# Patient Record
Sex: MALE | Race: WHITE
[De-identification: names, ages, dates, MRNs, and addresses within clinical notes are randomized per-mention and may not be internally consistent; named-entity substitution may affect disease eponyms.]

---

## 2019-04-24 ENCOUNTER — HOSPITAL ENCOUNTER (INPATIENT)
Dept: HOSPITAL 74 - JER | Age: 64
LOS: 2 days | Discharge: HOME | DRG: 199 | End: 2019-04-26
Attending: INTERNAL MEDICINE | Admitting: INTERNAL MEDICINE
Payer: COMMERCIAL

## 2019-04-24 VITALS — BODY MASS INDEX: 25.8 KG/M2

## 2019-04-24 DIAGNOSIS — E03.9: ICD-10-CM

## 2019-04-24 DIAGNOSIS — E88.09: ICD-10-CM

## 2019-04-24 DIAGNOSIS — F14.23: ICD-10-CM

## 2019-04-24 DIAGNOSIS — N17.9: ICD-10-CM

## 2019-04-24 DIAGNOSIS — R94.31: ICD-10-CM

## 2019-04-24 DIAGNOSIS — I42.8: ICD-10-CM

## 2019-04-24 DIAGNOSIS — I50.22: ICD-10-CM

## 2019-04-24 DIAGNOSIS — R80.9: ICD-10-CM

## 2019-04-24 DIAGNOSIS — Z95.1: ICD-10-CM

## 2019-04-24 DIAGNOSIS — F11.23: ICD-10-CM

## 2019-04-24 DIAGNOSIS — E78.5: ICD-10-CM

## 2019-04-24 DIAGNOSIS — I16.1: Primary | ICD-10-CM

## 2019-04-24 DIAGNOSIS — N18.4: ICD-10-CM

## 2019-04-24 DIAGNOSIS — F41.8: ICD-10-CM

## 2019-04-24 DIAGNOSIS — F17.210: ICD-10-CM

## 2019-04-24 DIAGNOSIS — I25.2: ICD-10-CM

## 2019-04-24 DIAGNOSIS — I13.0: ICD-10-CM

## 2019-04-24 DIAGNOSIS — K59.00: ICD-10-CM

## 2019-04-24 LAB
ALBUMIN SERPL-MCNC: 2.8 G/DL (ref 3.4–5)
ALP SERPL-CCNC: 55 U/L (ref 45–117)
ALT SERPL-CCNC: 24 U/L (ref 13–61)
ANION GAP SERPL CALC-SCNC: 6 MMOL/L (ref 8–16)
AST SERPL-CCNC: 33 U/L (ref 15–37)
BASOPHILS # BLD: 1 % (ref 0–2)
BILIRUB SERPL-MCNC: 0.3 MG/DL (ref 0.2–1)
BNP SERPL-MCNC: 955.2 PG/ML (ref 5–125)
BUN SERPL-MCNC: 26 MG/DL (ref 7–18)
CALCIUM SERPL-MCNC: 8.1 MG/DL (ref 8.5–10.1)
CHLORIDE SERPL-SCNC: 106 MMOL/L (ref 98–107)
CO2 SERPL-SCNC: 29 MMOL/L (ref 21–32)
CREAT SERPL-MCNC: 2.8 MG/DL (ref 0.55–1.3)
DEPRECATED PREALB SERPL-CCNC: 27.8 MG/DL (ref 20–40)
DEPRECATED RDW RBC AUTO: 15.6 % (ref 11.9–15.9)
EOSINOPHIL # BLD: 9.5 % (ref 0–4.5)
GLUCOSE SERPL-MCNC: 79 MG/DL (ref 74–106)
HCT VFR BLD CALC: 36.3 % (ref 35.4–49)
HGB BLD-MCNC: 12 GM/DL (ref 11.7–16.9)
INR BLD: 0.91 (ref 0.83–1.09)
LYMPHOCYTES # BLD: 24.8 % (ref 8–40)
MAGNESIUM SERPL-MCNC: 1.9 MG/DL (ref 1.8–2.4)
MCH RBC QN AUTO: 30.7 PG (ref 25.7–33.7)
MCHC RBC AUTO-ENTMCNC: 33.1 G/DL (ref 32–35.9)
MCV RBC: 92.7 FL (ref 80–96)
MONOCYTES # BLD AUTO: 9.3 % (ref 3.8–10.2)
NEUTROPHILS # BLD: 55.4 % (ref 42.8–82.8)
PHOSPHATE SERPL-MCNC: 3 MG/DL (ref 2.5–4.9)
PLATELET # BLD AUTO: 266 K/MM3 (ref 134–434)
PMV BLD: 8 FL (ref 7.5–11.1)
POTASSIUM SERPLBLD-SCNC: 4.4 MMOL/L (ref 3.5–5.1)
PROT SERPL-MCNC: 6.4 G/DL (ref 6.4–8.2)
PT PNL PPP: 10.7 SEC (ref 9.7–13)
RBC # BLD AUTO: 3.91 M/MM3 (ref 4–5.6)
SODIUM SERPL-SCNC: 141 MMOL/L (ref 136–145)
WBC # BLD AUTO: 5.1 K/MM3 (ref 4–10)

## 2019-04-24 PROCEDURE — G0378 HOSPITAL OBSERVATION PER HR: HCPCS

## 2019-04-24 RX ADMIN — HEPARIN SODIUM SCH UNIT: 5000 INJECTION, SOLUTION INTRAVENOUS; SUBCUTANEOUS at 23:10

## 2019-04-24 RX ADMIN — POLYETHYLENE GLYCOL 3350 SCH: 17 POWDER, FOR SOLUTION ORAL at 12:02

## 2019-04-24 NOTE — HP
CHIEF COMPLAINT: Sent from [facility] fro Elevated BP





PCP: Dr Arango





HISTORY OF PRESENT ILLNESS:


Pt is a 62 yo M with a PMH of fentanyl, heroin and cocaine dependence, s/p 

thyroidectomy with hypothyroidism, HTN, MI, CAD s/p CABG (2016), and CKD (

reports one kidney), depression, anxiety sent in from [facility] for elevated 

blood pressure to 217/121 after he presented for heroin detox. Pt reports no 

chest pain or SOB. Has bilateral leg swelling but can not give duration. 

Ambulates with a cane for gait instability. Pt reports a chronic cough, non 

productive (current smoker), no hx of COPD or asthma.  Pt is non-domiciled, has 

been snorting heroin/fentanyl combined for over 20 years, last use reported was 

yesterday. Pt denies injection drug use. Denies alcohol use but reports 

unprotected sexual intercourse. Said he was negative for HIV in distant past, 

no recent tests done. Patient had a prior short term detox (1 year ago) in the 

[place], does not want long term rehab. This morning he was asking for his "dope" 

while I examined him.


No fevers, no dysuria or abnormal discharge, pt reports constipation requiring 

suppositories





ER course was notable for:


(1) Elevated BP- 224/110>>179/111>>129/139


(2) HCTZ in ED


(3) BUN/Cr- 26/2.8, trop-0.04,


((4) EKG- SR-61bpm, LAD, LVH (V3 criteria), TWI (inferiorlat leads) no prior to 

compare with, QTC-457


(5) CXR- No CHF, no acute changes, Cardiomegaly with aortic ununcoiling





Recent Travel:





PAST MEDICAL HISTORY:


fentanyl, heroin and cocaine dependence, s/p thyroidectomy with hypothyroidism, 

HTN, MI, CAD s/p CABG (2016), and CKD, depression, anxiety





PAST SURGICAL HISTORY:


s/p thyroidectomy


s/p CABG





Social History:


Smoking:cigarettes 1ppd x30 until 1/2 years ago now 1-2 cigs per day


Alcohol:Denies


Drugs:  fentanyl, heroin and cocaine 





Family History:


Allergies





No Known Allergies Allergy (Verified 04/23/19 22:59)


 








HOME MEDICATIONS:


 Home Medications











 Medication  Instructions  Recorded


 


Levothyroxine [Synthroid -] 125 mcg PO DAILY 04/23/19








REVIEW OF SYSTEMS


CONSTITUTIONAL: 


Absent:  fever, chills, diaphoresis, generalized weakness, malaise, loss of 

appetite, weight change


HEENT: 


Absent:  rhinorrhea, nasal congestion, throat pain, throat swelling, difficulty 

swallowing, mouth swelling, ear pain, eye pain, visual changes


CARDIOVASCULAR: 


Absent: chest pain, syncope, palpitations, irregular heart rate, lightheadedness

, peripheral edema


RESPIRATORY: 


Absent: cough, shortness of breath, dyspnea with exertion, orthopnea, wheezing, 

stridor, hemoptysis


GASTROINTESTINAL:


Absent: abdominal pain, abdominal distension, nausea, vomiting, diarrhea, 

constipation, melena, hematochezia


GENITOURINARY: 


Absent: dysuria, frequency, urgency, hesitancy, hematuria, flank pain, genital 

pain


MUSCULOSKELETAL: 


Absent: myalgia, arthralgia, joint swelling, back pain, neck pain


SKIN: 


Absent: rash, itching, pallor


HEMATOLOGIC/IMMUNOLOGIC: 


Absent: easy bleeding, easy bruising, lymphadenopathy, frequent infections


ENDOCRINE:


Absent: unexplained weight gain, unexplained weight loss, heat intolerance, 

cold intolerance


NEUROLOGIC: 


Absent: headache, focal weakness or paresthesias, dizziness, unsteady gait, 

seizure, mental status changes, bladder or bowel incontinence


PSYCHIATRIC: 


Absent: anxiety, depression, suicidal or homicidal ideation, hallucinations.








PHYSICAL EXAMINATION


 Vital Signs - 24 hr











  04/24/19 04/24/19 04/24/19





  01:15 05:53 06:59


 


Temperature 98.4 F 97.6 F 


 


Pulse Rate 68  


 


Pulse Rate [  63 





Left Radial]   


 


Respiratory 18  





Rate   


 


Blood Pressure 224/110 H  221/139 H


 


Blood Pressure  179/111 H 





[Right Arm]   


 


O2 Sat by Pulse 98 99 





Oximetry (%)   











GENERAL: Awake, alert, and fully oriented, in no acute distress, yawning 

continously, reporting feeling cold.


HEAD: Normal with no signs of trauma.


EYES: Miosed,Pupils equal, round and reactive to light, extraocular movements 

intact, sclera anicteric, conjunctiva clear. 


EARS, NOSE, THROAT: oropharynx clear without exudates. Moist mucous membranes.


NECK: Normal range of motion, supple without lymphadenopathy, JVD


LUNGS: Healed Stenotomy scar, Breath sounds equal, clear to auscultation 

bilaterally. No wheezes, and no crackles. 


HEART: Regular rate and rhythm, normal S1 and S2 without murmur


ABDOMEN: Soft, nontender, not distended, normoactive bowel sounds, no guarding, 

no rebound, no masses. 


MUSCULOSKELETAL: Normal range of motion at all joints. No bony deformities or 

tenderness. No CVA tenderness.


LOWER EXTREMITIES: 2+ pulses, warm, well-perfused. No calf tenderness. No 

peripheral edema. 


NEUROLOGICAL:  Cranial nerves II-XII intact. Normal speech. Normal gait.


PSYCHIATRIC: Cooperative. Good eye contact. Appropriate mood and affect.


SKIN: Warm, dry, normal turgor, no rashes or lesions noted, normal capillary 

refill. No needle tracks





 CBC, BMP





 04/24/19 05:40 





 04/24/19 05:40 








 Laboratory Results - last 24 hr











  04/24/19 04/24/19 04/24/19





  05:40 05:40 05:40


 


WBC  5.1  


 


RBC  3.91 L  


 


Hgb  12.0  


 


Hct  36.3  


 


MCV  92.7  


 


MCH  30.7  


 


MCHC  33.1  


 


RDW  15.6  


 


Plt Count  266  


 


MPV  8.0  


 


Absolute Neuts (auto)  2.8  


 


Neutrophils %  55.4  


 


Lymphocytes %  24.8  


 


Monocytes %  9.3  


 


Eosinophils %  9.5 H  


 


Basophils %  1.0  


 


Nucleated RBC %  0  


 


PT with INR   10.70 


 


INR   0.91 


 


Sodium    141


 


Potassium    4.4


 


Chloride    106


 


Carbon Dioxide    29


 


Anion Gap    6 L


 


BUN    26 H


 


Creatinine    2.8 H


 


Creat Clearance w eGFR    23.08


 


Random Glucose    79


 


Calcium    8.1 L


 


Phosphorus    3.0


 


Magnesium    1.9


 


Total Bilirubin    0.3


 


AST    33


 


ALT    24


 


Alkaline Phosphatase    55


 


Creatine Kinase    697 H


 


Creatine Kinase Index    1.4


 


CK-MB (CK-2)    9.9 H


 


Troponin I    0.04


 


B-Natriuretic Peptide    955.2 H


 


Total Protein    6.4


 


Albumin    2.8 L











ASSESSMENT/PLAN:





Pt is a 62 yo M with a PMH of fentanyl, heroin and cocaine dependence, s/p 

thyroidectomy with hypothyroidism, HTN, MI, CAD s/p CABG (2016), and CKD (

reports one kidney),  depression, anxiety sent in from [facility] for elevated 

blood pressure to 217/121 after he presented for heroin detox. 





#Hypertensive Emergency


BP-taken by me-220/136 with elevated creatinine- 2.8


Unsure of patient's baseline, will be cautious in dropping the BP too rapidly


Unclear if CKD or CUAUHTEMOC on CKD (one kidney reported)


Pt received HCTZ - will dc HCTZ with poor renal function


Give hydralazine 10mg tid


Q1H BP


Urinary lytes


Spot cr-alb ratio


UA stat


Kidney/Bladder -US


BNP-955, pt with b/l leg edema but does not look hypervolemic


Tele


ECHO


Pt with cardiomegaly on CXR


 EKG- SR-61bpm, LAD, LVH (V3 criteria), TWI (inferiorlat leads) no prior to 

compare with, QTC-457


Trops-0.04, trend


Repeat EKG in am (on methadone)


CXR- No CHF, no PNA, no atelectasis, uncoiling of aorta, cardiomegaly





#Elevated Cr


Could be in setting of hypertensive emergency


BP control as above


Pt reports one kidney, unclear if he had nephrectomy, could be related to 

chronic, poorly mx hypertension


Will hold off hydration at this time


Urinary lytes


Spot cr-alb ratio


UA stat


Kidney/Bladder -US





#Polysubstance use disorder


UA, utox stat


COWS- 5


Patient requesting his dope


Will start methadone taper


Avoid BB blockers





#s/p thyroidectomy with hypothyroidism


Pt said to be on levothyroxine





#Hx of MI, CAD s/p CABG (2016)


Unsure of medications/compliance of pt


EKG- SR-61bpm, LAD, LVH (V3 criteria), TWI (inferiorlat leads) no prior to 

compare with, QTC-457


No prior EKG to compare with


Unlikely new changes as patient had no chest pain and minimally elevated trops 

which could be in setting of demand ischemia due to the elevated BP, or due to 

poor excretion from the renal pathology


Will trend trops


Will hold off AC at this time


Will confirm medications


Pt needs to avoid BB blockers in setting of polysubstance abuse





#FEN


No standing fluids


Monitor  serum lytes, replete as needed


Urinary lytes


Sodium controlled diet





#PPx


Heparin sq





#Dispo


Admit Tele


























Visit type





- Emergency Visit


Emergency Visit: Yes


ED Registration Date: 04/24/19


Care time: The patient presented to the Emergency Department on the above date 

and was hospitalized for further evaluation of their emergent condition.





- New Patient


This patient is new to me today: Yes


Date on this admission: 04/24/19





- Critical Care


Critical Care patient: No

## 2019-04-24 NOTE — PDOC
Documentation entered by Lolis Hicks SCRIBE, acting as scribe for Dorys Mortensen DO.








Dorys Mortensen DO:  This documentation has been prepared by the 

Fabiola fong Daisy, SCRIBE, under my direction and personally reviewed by me 

in its entirety.  I confirm that the documentation accurately reflects all work

, treatment, procedures, and medical decision making performed by me.  





Attending Attestation





- Resident


Resident Name: Ayesha Hamilton





- ED Attending Attestation


I have performed the following: I have examined & evaluated the patient, The 

case was reviewed & discussed with the resident, I agree w/resident's findings 

& plan





- HPI


HPI: 





04/24/19 05:27





The patient is a 63 YOM with a PMH of fentanyl, heroin and cocaine dependence, 

thyroid removal now with hypothyroidism, HTN, MI, CAD s/p CABG, and CKD sent in 

from [facility] for elevated blood pressure. Patient''s bp was found to be in 

the 200s over 140s. Patient was refusing his methadone over at [facility] 2/2 

his kidney issues. 





The patient denies chest pain, shortness of breath, headache and dizziness.


Denies fever, chills, nausea, vomit, diarrhea and constipation.


Denies dysuria, frequency, urgency and hematuria.





Allergies: NKDA


Surgeries: s/p CABG 





 








- Physicial Exam


PE: 





04/24/19 05:26


Agree with resident's exam.





- Medical Decision Making





04/24/19 06:13


62 yo male sent for med clearance from rehab due to elevated blood pressure.


pt has no complaints at this time


Plan for EKG, labs and return to rehabilitation facility if cleared

## 2019-04-24 NOTE — EKG
Test Reason : 

Blood Pressure : ***/*** mmHG

Vent. Rate : 061 BPM     Atrial Rate : 061 BPM

   P-R Int : 134 ms          QRS Dur : 094 ms

    QT Int : 454 ms       P-R-T Axes : 022 -61 267 degrees

   QTc Int : 457 ms

 

NORMAL SINUS RHYTHM WITH SINUS ARRHYTHMIA

LEFT AXIS DEVIATION

MINIMAL VOLTAGE CRITERIA FOR LVH, MAY BE NORMAL VARIANT

SEPTAL INFARCT , AGE UNDETERMINED

T WAVE ABNORMALITY, CONSIDER INFEROLATERAL ISCHEMIA

ABNORMAL ECG

NO PREVIOUS ECGS AVAILABLE

Confirmed by ZIA TOMLIN, JAXON ([id number]) on 4/24/2019 10:27:31 AM

 

Referred By:             Confirmed By:JAXON LIZARRAGA MD

## 2019-04-24 NOTE — PN
Teaching Attending Note


Name of Resident: Jennifer Pereira





ATTENDING PHYSICIAN STATEMENT





I saw and evaluated the patient.


I reviewed the resident's note and discussed the case with the resident.


I agree with the resident's findings and plan as documented.








SUBJECTIVE: This is a 63 year old man with a history of HTN, CAD, MI, CABG, 

hypothyroidism, thyroidectomy, CKD, depression, anxiety who presented to [facility] last night requesting detox from heroin. He was not withdrawing at the 

time. He did not want to be detoxed with methadone. BP was 217/121 and so he 

was sent to the ED. Currently he is lethargic, yawning, and is asking for 

something for withdrawal.








OBJECTIVE:


 Vital Signs











 Period  Temp  Pulse  Resp  BP Sys/Jimenez  Pulse Ox


 


 Last 24 Hr  97.6 F-98.4 F  61-68  16-18  179-224/  96-99








HEART: S1S2, RRR


LUNGS: Clear


ABDOMEN: Soft, non-tender, non-distended, normal BS


EXTREMITIES: No edema


 Laboratory Tests











  04/24/19 04/24/19 04/24/19





  05:40 05:40 05:40


 


WBC  5.1  


 


RBC  3.91 L  


 


Hgb  12.0  


 


Hct  36.3  


 


MCV  92.7  


 


MCH  30.7  


 


MCHC  33.1  


 


RDW  15.6  


 


Plt Count  266  


 


MPV  8.0  


 


Absolute Neuts (auto)  2.8  


 


Neutrophils %  55.4  


 


Lymphocytes %  24.8  


 


Monocytes %  9.3  


 


Eosinophils %  9.5 H  


 


Basophils %  1.0  


 


Nucleated RBC %  0  


 


PT with INR   10.70 


 


INR   0.91 


 


Sodium    141


 


Potassium    4.4


 


Chloride    106


 


Carbon Dioxide    29


 


Anion Gap    6 L


 


BUN    26 H


 


Creatinine    2.8 H


 


Creat Clearance w eGFR    23.08


 


Random Glucose    79


 


Calcium    8.1 L


 


Phosphorus    3.0


 


Magnesium    1.9


 


Total Bilirubin    0.3


 


AST    33


 


ALT    24


 


Alkaline Phosphatase    55


 


Creatine Kinase    697 H


 


Creatine Kinase Index    1.4


 


CK-MB (CK-2)    9.9 H


 


Troponin I    0.04


 


B-Natriuretic Peptide    955.2 H


 


Total Protein    6.4


 


Albumin    2.8 L


 


Prealbumin    27.8


 


TSH    79.70 H








 Home Medications











 Medication  Instructions  Recorded


 


Levothyroxine [Synthroid -] 125 mcg PO DAILY 04/23/19














ASSESSMENT AND PLAN:


his is a 63 year old man with a history of HTN, CAD, MI, CABG, hypothyroidism, 

thyroidectomy, CKD, depression, anxiety who was sent to the ED from [facility] 

after he was found to have /121 while being evaluated for heroin detox.





1. Hypertensive emergency


   - HCTZ 25 mg PO, Clonidine 0.1 mg PO given in ED with no significant 

improvement


   - Hydralazine 10 mg PO given - if no improvement, will start nicardipine drip


2. Possible acute kidney injury, possible stage 4 CKD


   - Baseline creatinine not known


   - Monitor creatinine


   - Renal US


3. Possible hypertensive heart disease


   - CXR shows cardiomegaly


   - .2


   - Echocardiogram


4. Opioid dependence with withdrawal


   - Start methadone detox


5. CAD, history of MI and CABG


6. Hypothyroidism, history of thyroidectomy


   - TSH 79.70, likely non-compliant with meds


   - Continue Synthroid 125 mcg daily


7. Depression and anxiety

## 2019-04-24 NOTE — PDOC
History of Present Illness





- General


Chief Complaint: Blood Pressure Problem


Stated Complaint: BLOOD PRESSURE PROBLEM


Time Seen by Provider: 04/24/19 04:39


History Source: Patient


Exam Limitations: Other (poor historian, does not know medications/limited PMH)





Past History





- Travel


Traveled outside of the country in the last 30 days: No


Close contact w/someone who was outside of country & ill: No





- Past Medical History


Allergies/Adverse Reactions: 


 Allergies











Allergy/AdvReac Type Severity Reaction Status Date / Time


 


No Known Allergies Allergy   Verified 04/23/19 22:59











Home Medications: 


Ambulatory Orders





Levothyroxine [Synthroid -] 125 mcg PO DAILY 04/23/19 








Asthma: No


Cardiac Disorders: No


Diabetes: No


GI Disorders: No


 Disorders: No


HTN: Yes


Seizures: No





- Surgical History


Cardiac Surgery: Yes (OPEN HEART PROBLEM 2016)





- Suicide/Smoking/Psychosocial Hx


Smoking History: Current some day smoker


Have you smoked in the past 12 months: Yes


Number of Cigarettes Smoked Daily: 10


Information on smoking cessation initiated: No


'Breaking Loose' booklet given: 04/24/19


Hx Alcohol Use: Yes


Drug/Substance Use Hx: Yes





**Review of Systems





- Review of Systems


Able to Perform ROS?: Yes


Is the patient limited English proficient: No


Constitutional: Yes: Weight Stable.  No: Chills, Diaphoresis, Fever, Loss of 

Appetite, Malaise, Weakness


HEENTM: No: Blurred Vision, Double Vision, Nose Congestion, Throat Pain, 

Difficulty Swallowing


Respiratory: No: Cough, Orthopnea, Shortness of Breath


Cardiac (ROS): No: Chest Pain, Edema, Irregular Heart Rate, Lightheadedness, 

Palpitations, Syncope, Chest Tightness


ABD/GI: No: Constipated, Diarrhea, Nausea, Poor Appetite, Poor Fluid Intake, 

Vomiting


: No: Burning, Dysuria, Frequency, Pain, Urgency


Musculoskeletal: No: Back Pain, Joint Pain, Muscle Pain, Muscle Weakness


Integumentary: No: Rash





*Physical Exam





- Vital Signs


 Last Vital Signs











Temp Pulse Resp BP Pulse Ox


 


 98.4 F   68   18   224/110 H  98 


 


 04/24/19 01:15  04/24/19 01:15  04/24/19 01:15  04/24/19 01:15  04/24/19 01:15














ED Treatment Course





- LABORATORY


CBC & Chemistry Diagram: 


 04/24/19 05:40





 04/24/19 05:40





Medical Decision Making





- Medical Decision Making


Pt was seen at bedside, also will be seen by attending Dr. Mortensen. Pt 

presenting from [facility] with elevated BP (220s/110s). Pt states he 

was attempting to present to [facility] for detox, and he last used fentanyl, 

cocaine, and heroin 3 days ago. 





Considering [vs vs]


Ordered work-up including CBC, CMP, cardiac profile, BNP, coags, UA, chest x-

ray.


Will continue to reassess pt and monitor for symptomatic improvement.





ECG: NSR, intervals WNL. No TWIs or significant ST segment changes. No 

significant changes from prior ECG.





BP continues to be elevated (224/110 to 177/111), providing 25 mg PO HCTZ. 





CBC WNL


CMP: BUN/Cr 26/2.8 (no prior for comparison)


Trop .04, 





Chest x-ray shows enlarged heart but no focal infiltrates.


Paging hospitalist team for admission


04/24/19 06:57





Pt admitted to hospitalist team (Dr. Hernandez). BP systolic elevated again to 

220s, with pt asymptomatic. Providing 10 mg PO diltiazem.  


04/24/19 07:13











*DC/Admit/Observation/Transfer


Diagnosis at time of Disposition: 


 Heroin abuse, Fentanyl use disorder, mild, abuse, Cocaine abuse





HTN (hypertension)


Qualifiers:


 Hypertension type: unspecified Qualified Code(s): I10 - Essential (primary) 

hypertension








- Discharge Dispostion


Condition at time of disposition: Stable





- Referrals





- Patient Instructions





- Post Discharge Activity

## 2019-04-24 NOTE — ECHO
______________________________________________________________________________



Name: SHORTYMARYRO                                      Exam:Adult Echocardiogram

MRN: [id number]         Study Date: 2019 11:58 AM

Age: 63 yrs

______________________________________________________________________________



Reason For Study: CARDIOMEGALY

Height: 68 in        Weight: 170 lb        BSA: 1.9 m2



______________________________________________________________________________



MMode/2D Measurements & Calculations

IVSd: 0.94 cm                                          Ao root diam: 2.8 cm

LVIDd: 4.7 cm                                          LA dimension: 3.4 cm

LVIDs: 3.6 cm

LVPWd: 0.90 cm



________________________________________________________

EDV(Teich): 104.3 ml                                   LVOT diam: 2.2 cm

ESV(Teich): 53.8 ml



Doppler Measurements & Calculations

MV E max justus: 39.5 cm/sec                               Ao V2 max: 100.4 cm/sec

MV A max justus: 69.1 cm/sec                               Ao max P.0 mmHg

MV E/A: 0.57                                            Ao V2 mean: 77.4 cm/sec

MV dec time: 0.26 sec                                   Ao mean P.6 mmHg

                                                        Ao V2 VTI: 19.1 cm



                                                        KATJA(I,D): 2.9 cm2

                                                        KATJA(V,D): 3.5 cm2



_________________________________________________________

LV V1 max PG: 3.2 mmHg                                  SV(LVOT): 55.4 ml

LV V1 mean P.5 mmHg

LV V1 max: 89.1 cm/sec

LV V1 mean: 56.7 cm/sec

LV V1 VTI: 14.2 cm

_________________________________________________________



TR max justus: 194.8 cm/sec                                PI end-d justus: 127.0 cm/sec

TR max PG: 15.2 mmHg

_________________________________________________________



Med Peak E' Justus: 2.5 cm/sec

Med E/e': 16.0

Lat Peak E' Justus: 4.4 cm/sec

Lat E/e': 9.0



______________________________________________________________________________



Procedure

A two-dimensional transthoracic echocardiogram with color flow and Doppler was performed.

Left Ventricle

The left ventricle is grossly normal size. Left ventricular systolic function is mild to moderately r
educed.

E/A reversal consistent with but not diagnostic of poor LV compliance. Regional wall motion abnormali
ties

cannot be excluded due to limited visualization.

Right Ventricle

The right ventricle is not well visualized.

Atria

Normal left and right atrial size and function.

Mitral Valve

There is mild mitral valve thickening. There is no mitral valve stenosis. There is mild mitral regurg
itation.

Tricuspid Valve

There is mild tricuspid valve thickening. There is no tricuspid stenosis. There is mild to moderate t
ricuspid

regurgitation. Right ventricular systolic pressure is normal.

Aortic Valve

The aortic valve is not well visualized. No hemodynamically significant valvular aortic stenosis. No 
aortic

regurgitation is present.

Pulmonic Valve

The pulmonic valve is not well visualized.

Great Vessels

The aortic root is normal size.

Pericardium/Pleura

There is no pericardial effusion.

______________________________________________________________________________





Interpretation Summary

The left ventricle is grossly normal size.

Left ventricular systolic function is mild to moderately reduced.

Regional wall motion abnormalities cannot be excluded due to limited visualization.

There is mild to moderate tricuspid regurgitation.

Right ventricular systolic pressure is normal.

E/A reversal consistent with but not diagnostic of poor LV compliance

There is mild mitral regurgitation.





MD Tye Quinonez 2019 01:41 PM

## 2019-04-25 LAB
AMPHET UR-MCNC: NEGATIVE NG/ML
APPEARANCE UR: CLEAR
BACTERIA #/AREA URNS HPF: 5.9 /HPF
BARBITURATES UR-MCNC: NEGATIVE NG/ML
BENZODIAZ UR SCN-MCNC: NEGATIVE NG/ML
BILIRUB UR STRIP.AUTO-MCNC: NEGATIVE MG/DL
CASTS #/AREA URNS LPF: 0 /LPF (ref 0–8)
COCAINE UR-MCNC: POSITIVE NG/ML
COLOR UR: YELLOW
CREAT UR-MCNC: 70 MG/DL (ref 20–320)
EPITH CASTS URNS QL MICRO: 0.3 /HPF
KETONES UR QL STRIP: NEGATIVE
LEUKOCYTE ESTERASE UR QL STRIP.AUTO: NEGATIVE
METHADONE UR-MCNC: NEGATIVE NG/ML
NITRITE UR QL STRIP: NEGATIVE
OPIATES UR QL SCN: NEGATIVE NG/ML
PCP UR QL SCN: NEGATIVE NG/ML
PH UR: 8 [PH] (ref 5–8)
PROT UR QL STRIP: (no result)
PROT UR QL STRIP: NEGATIVE
RATIO URIN PROTEIN/URIN CREAT: 1.48 MG/DL
RBC # BLD AUTO: 1 /HPF (ref 0–4)
SP GR UR: 1.01 (ref 1.01–1.03)
UROBILINOGEN UR STRIP-MCNC: 0.2 MG/DL (ref 0.2–1)
WBC # UR AUTO: 1 /HPF (ref 0–5)

## 2019-04-25 RX ADMIN — HEPARIN SODIUM SCH UNIT: 5000 INJECTION, SOLUTION INTRAVENOUS; SUBCUTANEOUS at 21:32

## 2019-04-25 RX ADMIN — LEVOTHYROXINE SODIUM SCH MCG: 125 TABLET ORAL at 06:54

## 2019-04-25 RX ADMIN — HYDRALAZINE HYDROCHLORIDE SCH MG: 25 TABLET, FILM COATED ORAL at 17:22

## 2019-04-25 RX ADMIN — HEPARIN SODIUM SCH UNIT: 5000 INJECTION, SOLUTION INTRAVENOUS; SUBCUTANEOUS at 00:23

## 2019-04-25 RX ADMIN — HEPARIN SODIUM SCH UNIT: 5000 INJECTION, SOLUTION INTRAVENOUS; SUBCUTANEOUS at 13:11

## 2019-04-25 RX ADMIN — HYDRALAZINE HYDROCHLORIDE SCH MG: 25 TABLET, FILM COATED ORAL at 21:32

## 2019-04-25 RX ADMIN — POLYETHYLENE GLYCOL 3350 SCH: 17 POWDER, FOR SOLUTION ORAL at 09:56

## 2019-04-25 RX ADMIN — HEPARIN SODIUM SCH UNIT: 5000 INJECTION, SOLUTION INTRAVENOUS; SUBCUTANEOUS at 07:06

## 2019-04-25 NOTE — CONSULT
Consult


Consult Specialty:: Nephrology


Reason for Consultation:: CKD





- History of Present Illness


Chief Complaint: sent in from [facility] for hypertension


History of Present Illness: 





Pt is a 63 year old male with pmhx of CKD, atrophic kidney, multiple drug 

dependence including heroin, cocaine and fentanyl, HTN, CAD, MI and depression 

who was sent in from [facility] for HTN. He says that he has not taken his bp 

meds for the last 2 months as he has been homeless. He denies chest pain or 

shortness of breath. He is awake of his ckd and is aware that he has an 

atrophic kidney. He has poor outpt follow up. He denies dysuria or hematuria. 





- History Source


History Provided By: Patient, Medical Record





- Past Medical History


Cardio/Vascular: Yes: CAD, HTN, Hyperlipdemia, MI


Renal/: Yes: Renal Inusuff


Endocrine: Yes: Hypothyroidism





- Alcohol/Substance Use


Hx Alcohol Use: Yes





- Smoking History


Smoking history: Former smoker


Have you smoked in the past 12 months: Yes


Aproximately how many cigarettes per day: 10





Home Medications





- Allergies


Allergies/Adverse Reactions: 


 Allergies











Allergy/AdvReac Type Severity Reaction Status Date / Time


 


No Known Allergies Allergy   Verified 04/23/19 22:59














- Home Medications


Home Medications: 


Ambulatory Orders





Levothyroxine [Synthroid -] 125 mcg PO DAILY 04/23/19 











Family Disease History





- Family Disease History


Family History: Denies





Review of Systems





- Review of Systems


Constitutional: reports: No Symptoms


Eyes: reports: No Symptoms


HENT: reports: No Symptoms


Neck: reports: No Symptoms


Cardiovascular: reports: No Symptoms


Respiratory: reports: No Symptoms


Gastrointestinal: reports: No Symptoms


Genitourinary: reports: No Symptoms


Musculoskeletal: reports: No Symptoms


Integumentary: reports: No Symptoms


Neurological: reports: No Symptoms


Endocrine: reports: No Symptoms


Hematology/Lymphatic: reports: No Symptoms


Psychiatric: reports: No Symptoms





Physical Exam


Vital Signs: 


 Vital Signs











Temperature  98.4 F   04/25/19 12:06


 


Pulse Rate  73   04/25/19 12:06


 


Respiratory Rate  16   04/25/19 12:06


 


Blood Pressure  151/95   04/25/19 12:06


 


O2 Sat by Pulse Oximetry (%)  98   04/25/19 12:06











Constitutional: Yes: Calm


Eyes: Yes: Conjunctiva Clear


HENT: Yes: Atraumatic


Neck: Yes: Supple


Cardiovascular: Yes: S1, S2


Respiratory: Yes: CTA Bilaterally


Gastrointestinal: Yes: Soft


Renal/: Yes: WNL


Musculoskeletal: Yes: WNL


Edema: No


Neurological: Yes: Oriented


Psychiatric: Yes: Oriented


Labs: 


 CBC, BMP





 04/24/19 05:40 





 04/24/19 05:40 





 Laboratory Tests











  04/24/19 04/24/19 04/25/19





  05:40 05:40 08:00


 


WBC  5.1  


 


Hgb  12.0  


 


Sodium   141 


 


Potassium   4.4 


 


BUN   26 H 


 


Creatinine   2.8 H 


 


Urine Protein    2+ H


 


Urine Blood    Negative


 


Cocaine Screen   














  04/25/19





  08:00


 


WBC 


 


Hgb 


 


Sodium 


 


Potassium 


 


BUN 


 


Creatinine 


 


Urine Protein 


 


Urine Blood 


 


Cocaine Screen  Positive A*














Imaging





- Results


Chest X-ray: Report Reviewed


Ultrasound: Report Reviewed





Problem List





- Problems


(1) CKD (chronic kidney disease)


Code(s): N18.9 - CHRONIC KIDNEY DISEASE, UNSPECIFIED   





(2) Cocaine abuse


Code(s): F14.10 - COCAINE ABUSE, UNCOMPLICATED   





(3) Fentanyl use disorder, mild, abuse


Code(s): F11.10 - OPIOID ABUSE, UNCOMPLICATED   





(4) HTN (hypertension)


Code(s): I10 - ESSENTIAL (PRIMARY) HYPERTENSION   


Qualifiers: 


   Hypertension type: unspecified   Qualified Code(s): I10 - Essential (primary

) hypertension   





Assessment/Plan





 Current Medications











Generic Name Dose Route Start Last Admin





  Trade Name Freq  PRN Reason Stop Dose Admin


 


Clonidine  0.1 mg  04/24/19 08:34  04/24/19 09:17





  Catapres -  PO  04/26/19 23:59  0.1 mg





  Q6H PRN   Administration





  Withdrawal Symptoms   





     





     





     


 


Heparin Sodium (Porcine)  5,000 unit  04/24/19 14:00  04/25/19 13:11





  Heparin -  SQ   5,000 unit





  TID NIGEL   Administration





     





     





     





     


 


Hydralazine HCl  10 mg  04/24/19 14:00  04/25/19 13:11





  Apresoline -  PO   10 mg





  TID NIGEL   Administration





     





     





     





     


 


Nicardipine HCl 25 mg/  250 mls @ 25 mls/hr  04/25/19 09:00  04/25/19 09:44





  Dextrose  IVPB   2.5 mg/hr





  TITR NIGEL   25 mls/hr





     Administration





     





  Protocol   





  2.5 MG/HR   


 


Levothyroxine Sodium  125 mcg  04/25/19 07:00  04/25/19 06:54





  Synthroid -  PO   125 mcg





  DAILY@0700 NIGEL   Administration





     





     





     





     


 


Methadone HCl  10 mg  04/26/19 10:00  





  Dolophine -  PO  04/26/19 10:01  





  ONCE ONE   





     





     





     





     


 


Methadone HCl  5 mg  04/27/19 06:00  





  Dolophine -  PO  04/27/19 06:01  





  ONCE ONE   





     





     





     





     


 


Polyethylene Glycol  17 gm  04/24/19 10:00  04/25/19 09:56





  Miralax (For Daily Use) -  PO   Not Given





  DAILY NIGEL   





     





     





     





     








Impression


1. CKD


2. atrophic right kidney


3. HTN


4. cad


5. multi drug abuse - cocaine, heroin and fentanyl


6. hypothyroidism


7. proteinuria





Plan


- bp is improving


- can start to transition to PO medication


- pt does not know what he was on


- hold off ace or arb for now until we can establish a baseline crt


- can start po procardia and hydralazine and monitor bp


- compliance appears to be a major issue with him


- decrease bp gradually

## 2019-04-25 NOTE — PN
Teaching Attending Note


Name of Resident: Sahra Melendrez





ATTENDING PHYSICIAN STATEMENT





I saw and evaluated the patient.


I reviewed the resident's note and discussed the case with the resident.


I agree with the resident's findings and plan as documented.








SUBJECTIVE: Patient has no complaints.








OBJECTIVE:


 Vital Signs











 Period  Temp  Pulse  Resp  BP Sys/Jimenez  Pulse Ox


 


 Last 24 Hr  98.2 F-98.4 F  67-89  16-18  148-209/  








HEART: S1S2, RRR


LUNGS: Clear


ABDOMEN: Soft, distended, non-tender, hyperactive BS


EXTREMITIES: No edema





 Laboratory Results - last 24 hr











  04/24/19 04/24/19 04/25/19





  18:45 18:45 08:00


 


Hemoglobin A1c %   5.5 


 


Creatine Kinase  656 H  


 


Creatine Kinase Index  1.4  


 


CK-MB (CK-2)  9.6 H  


 


Troponin I  0.03  


 


TSH  52.60 H D  


 


Urine Color    Yellow


 


Urine Appearance    Clear


 


Urine pH    8.0


 


Ur Specific Gravity    1.014


 


Urine Protein    2+ H


 


Urine Glucose (UA)    Negative


 


Urine Ketones    Negative


 


Urine Blood    Negative


 


Urine Nitrite    Negative


 


Urine Bilirubin    Negative


 


Urine Urobilinogen    0.2


 


Ur Leukocyte Esterase    Negative


 


Urine WBC (Auto)    1


 


Urine RBC (Auto)    1


 


Urine Casts (Auto)    0


 


U Epithel Cells (Auto)    0.3


 


Urine Bacteria (Auto)    5.9


 


U Random Total Protein   


 


Ur Random Sodium   


 


Ur Random Potassium   


 


Ur Random Chloride   


 


Urine Creatinine   


 


Protein/Creatinin Ratio   


 


Opiates Screen   


 


Methadone Screen   


 


Barbiturate Screen   


 


Phencyclidine Screen   


 


Ur Amphetamines Screen   


 


MDMA (Ecstasy) Screen   


 


Benzodiazepines Screen   


 


Cocaine Screen   


 


U Marijuana (THC) Screen   














  04/25/19 04/25/19 04/25/19





  08:00 08:00 08:00


 


Hemoglobin A1c %   


 


Creatine Kinase   


 


Creatine Kinase Index   


 


CK-MB (CK-2)   


 


Troponin I   


 


TSH   


 


Urine Color   


 


Urine Appearance   


 


Urine pH   


 


Ur Specific Gravity   


 


Urine Protein   


 


Urine Glucose (UA)   


 


Urine Ketones   


 


Urine Blood   


 


Urine Nitrite   


 


Urine Bilirubin   


 


Urine Urobilinogen   


 


Ur Leukocyte Esterase   


 


Urine WBC (Auto)   


 


Urine RBC (Auto)   


 


Urine Casts (Auto)   


 


U Epithel Cells (Auto)   


 


Urine Bacteria (Auto)   


 


U Random Total Protein   103.8 H 


 


Ur Random Sodium  154  


 


Ur Random Potassium  32.9  


 


Ur Random Chloride  161  


 


Urine Creatinine   70.0 


 


Protein/Creatinin Ratio   1.480 


 


Opiates Screen    Negative


 


Methadone Screen    Negative


 


Barbiturate Screen    Negative


 


Phencyclidine Screen    Negative


 


Ur Amphetamines Screen    Negative


 


MDMA (Ecstasy) Screen    Negative


 


Benzodiazepines Screen    Negative


 


Cocaine Screen    Positive A*


 


U Marijuana (THC) Screen    Negative








 Current Medications











Generic Name Dose Route Start Last Admin





  Trade Name Zakiya  PRN Reason Stop Dose Admin


 


Clonidine  0.1 mg  04/24/19 08:34  04/24/19 09:17





  Catapres -  PO  04/26/19 23:59  0.1 mg





  Q6H PRN   Administration





  Withdrawal Symptoms   





     





     





     


 


Heparin Sodium (Porcine)  5,000 unit  04/24/19 14:00  04/25/19 13:11





  Heparin -  SQ   5,000 unit





  TID NIGEL   Administration





     





     





     





     


 


Hydralazine HCl  25 mg  04/25/19 18:00  





  Apresoline -  PO   





  QID NIGEL   





     





     





     





     


 


Levothyroxine Sodium  125 mcg  04/25/19 07:00  04/25/19 06:54





  Synthroid -  PO   125 mcg





  DAILY@0700 [facility]   Administration





     





     





     





     


 


Methadone HCl  10 mg  04/26/19 10:00  





  Dolophine -  PO  04/26/19 10:01  





  ONCE ONE   





     





     





     





     


 


Methadone HCl  5 mg  04/27/19 06:00  





  Dolophine -  PO  04/27/19 06:01  





  ONCE ONE   





     





     





     





     


 


Polyethylene Glycol  17 gm  04/24/19 10:00  04/25/19 09:56





  Miralax (For Daily Use) -  PO   Not Given





  DAILY [facility]   





     





     





     





     














ASSESSMENT AND PLAN:


This is a 63 year old man with a history of HTN, CAD, MI, CABG, hypothyroidism, 

thyroidectomy, CKD, depression, anxiety who was sent to the ED from [facility] 

after he was found to have /121 while being evaluated for heroin detox.





1. Hypertensive emergency


   - BP better with nicardipine drip


   - Continue hydralazine, start Procardia, and wean nicardipine drip


2. Possible acute kidney injury, possible stage 4 CKD


   - Baseline creatinine not known


   - Monitor creatinine


   - Renal US shows atrophic right kidney, echogenic left kidney


3. Chronic systolic heart failure


   - .2


   - Echo shows normal size LV, mild to moderately reduced EF, mild to moderate 

TR, normal RV systolic pressure, E/A reversal, mild MR


4. Opioid dependence with withdrawal


   - Continue methadone detox


5. CAD, history of MI and CABG


6. Hypothyroidism, history of thyroidectomy


   - TSH 79.70, likely non-compliant with meds


   - Continue Synthroid 125 mcg daily and repeat TSH in 4-6 weeks


7. Depression and anxiety

## 2019-04-25 NOTE — PN
Physical Exam: 


SUBJECTIVE: Patient seen and examined this AM. Patient admits noncompliance 

with home BP Regimen. No acute overnight events as per nursing staff. 








OBJECTIVE:





 Vital Signs











 Period  Temp  Pulse  Resp  BP Sys/Jimenez  Pulse Ox


 


 Last 24 Hr  98.2 F-98.4 F  67-89  16-18  151-209/  











GENERAL: A&Ox3, NAD


HEAD: NCAT


EYES: PERRL, EOMI


ENT: moist mucous membranes


NECK: Supple


LUNGS: Diminished breath sounds at the bases, no wheezes, no crackles


HEART: Regular rate and rhythm, S1, S2 without murmur


ABDOMEN: Soft, nontender, nondistended, + bowel sounds, no guarding


EXTREMITIES: no edema. 


NEUROLOGICAL: Cranial nerves II through XII grossly intact. 


SKIN: Warm, dry








 Laboratory Results - last 24 hr











  04/24/19 04/24/19 04/25/19





  18:45 18:45 08:00


 


Hemoglobin A1c %   5.5 


 


Creatine Kinase  656 H  


 


Creatine Kinase Index  1.4  


 


CK-MB (CK-2)  9.6 H  


 


Troponin I  0.03  


 


TSH  52.60 H D  


 


Urine Color    Yellow


 


Urine Appearance    Clear


 


Urine pH    8.0


 


Ur Specific Gravity    1.014


 


Urine Protein    2+ H


 


Urine Glucose (UA)    Negative


 


Urine Ketones    Negative


 


Urine Blood    Negative


 


Urine Nitrite    Negative


 


Urine Bilirubin    Negative


 


Urine Urobilinogen    0.2


 


Ur Leukocyte Esterase    Negative


 


Urine WBC (Auto)    1


 


Urine RBC (Auto)    1


 


Urine Casts (Auto)    0


 


U Epithel Cells (Auto)    0.3


 


Urine Bacteria (Auto)    5.9


 


U Random Total Protein   


 


Ur Random Sodium   


 


Ur Random Potassium   


 


Ur Random Chloride   


 


Urine Creatinine   


 


Protein/Creatinin Ratio   


 


Opiates Screen   


 


Methadone Screen   


 


Barbiturate Screen   


 


Phencyclidine Screen   


 


Ur Amphetamines Screen   


 


MDMA (Ecstasy) Screen   


 


Benzodiazepines Screen   


 


Cocaine Screen   


 


U Marijuana (THC) Screen   














  04/25/19 04/25/19 04/25/19





  08:00 08:00 08:00


 


Hemoglobin A1c %   


 


Creatine Kinase   


 


Creatine Kinase Index   


 


CK-MB (CK-2)   


 


Troponin I   


 


TSH   


 


Urine Color   


 


Urine Appearance   


 


Urine pH   


 


Ur Specific Gravity   


 


Urine Protein   


 


Urine Glucose (UA)   


 


Urine Ketones   


 


Urine Blood   


 


Urine Nitrite   


 


Urine Bilirubin   


 


Urine Urobilinogen   


 


Ur Leukocyte Esterase   


 


Urine WBC (Auto)   


 


Urine RBC (Auto)   


 


Urine Casts (Auto)   


 


U Epithel Cells (Auto)   


 


Urine Bacteria (Auto)   


 


U Random Total Protein   103.8 H 


 


Ur Random Sodium  154  


 


Ur Random Potassium  32.9  


 


Ur Random Chloride  161  


 


Urine Creatinine   70.0 


 


Protein/Creatinin Ratio   1.480 


 


Opiates Screen    Negative


 


Methadone Screen    Negative


 


Barbiturate Screen    Negative


 


Phencyclidine Screen    Negative


 


Ur Amphetamines Screen    Negative


 


MDMA (Ecstasy) Screen    Negative


 


Benzodiazepines Screen    Negative


 


Cocaine Screen    Positive A*


 


U Marijuana (THC) Screen    Negative





 


 


 Microbiology





04/24/19 05:40   Urine - Urine Clean Catch   Urine Culture - Final


                            NO GROWTH OBTAINED








Active Medications





Clonidine (Catapres -)  0.1 mg PO Q6H PRN


   PRN Reason: Withdrawal Symptoms


   Stop: 04/26/19 23:59


   Last Admin: 04/24/19 09:17 Dose:  0.1 mg


Heparin Sodium (Porcine) (Heparin -)  5,000 unit SQ TID [facility]


   Last Admin: 04/25/19 13:11 Dose:  5,000 unit


Hydralazine HCl (Apresoline -)  10 mg PO TID [facility]


   Last Admin: 04/25/19 13:11 Dose:  10 mg


Nicardipine HCl 25 mg/ (Dextrose)  250 mls @ 25 mls/hr IVPB TITR [facility]; Protocol


   Last Admin: 04/25/19 09:44 Dose:  2.5 mg/hr, 25 mls/hr


Levothyroxine Sodium (Synthroid -)  125 mcg PO DAILY@0700 [facility]


   Last Admin: 04/25/19 06:54 Dose:  125 mcg


Methadone HCl (Dolophine -)  10 mg PO ONCE ONE


   Stop: 04/26/19 10:01


Methadone HCl (Dolophine -)  5 mg PO ONCE ONE


   Stop: 04/27/19 06:01


Polyethylene Glycol (Miralax (For Daily Use) -)  17 gm PO DAILY [facility]


   Last Admin: 04/25/19 09:56 Dose:  Not Given








IMAGING:


-CXR: Cardiomegaly. There is no evidence of pneumonia, atelectasis, CHF. 


-Kidney/Renal US: Atrophic right kidney. Echogenic left kidney that may 

represent medical renal disease. Correlate clinically. There is no gross 

evidence of hydronephrosis or renal stones, bilaterally. There is suggestion of 

incidental gallstone for which correlation with a dedicated gallbladder 

ultrasound.


-EKG: NSR, LAD, VR 69, QTc 462


-ECHO: LV is grossly normal in size. LV systolic function is mild to moderately 

reduced. Regional QMA cannot be excluded. Mild to moderate TR. Mild MR. 








ASSESSMENT/PLAN:


64 y/o M with PMHx polysubstance abuse (fentanyl, heroin, cocaine), 

Hypothyroidism (s/p thyroidectomy), HTN, MI, CAD (s/p CABG in 2016), CKD, 

depression, anxiety who presented to [facility] for detox, found to have 

elevated BP and was admitted to [facility] for Hypertensive Emergency.





#Hypertensive Emergency


-HTN + Elevated Cr 2.8


-Echo and EKG Noted above


-Started on Nicardipine drip; Will titrate down


-Increase Hydralazine to 25mg PO QID


-Continue Clonidine 0.1mg PO Q6H PRN


-Continue to monitor BP Q2H


-Tele


-Will hold off on ACEi or ARB, pending further nephro rec's


-Will consider PO Procardia if remains hypertensive on Hydralazine





#Acute on CKD


-In the setting of hypertensive emergency--BP Control as above


-Kidney/Renal US noted above


-Nephrology consulted, appreciate rec's





#Polysubstance Use Disorder--COWS score 5 this AM


-Methadone taper





#Hypothyroidism


-Levothyroxine 125 mcg PO Daily





#CAD


-Trops 0.04-->0.03





#Constipation


-Polyethylene Glycol 17gm PO Daily





#FEN


-No standing fluids


-Lytes WNL


-Sodium controlled diet





#PPx


-DVT: Heparin TID











Visit type





- Emergency Visit


Emergency Visit: Yes


ED Registration Date: 04/24/19


Care time: The patient presented to the Emergency Department on the above date 

and was hospitalized for further evaluation of their emergent condition.





- New Patient


This patient is new to me today: Yes


Date on this admission: 04/25/19





- Critical Care


Critical Care patient: No





- Discharge Referral


Referred to [facility] No

## 2019-04-25 NOTE — EKG
Test Reason : 

Blood Pressure : ***/*** mmHG

Vent. Rate : 069 BPM     Atrial Rate : 069 BPM

   P-R Int : 138 ms          QRS Dur : 098 ms

    QT Int : 432 ms       P-R-T Axes : 050 -66 259 degrees

   QTc Int : 462 ms

 

NORMAL SINUS RHYTHM

LEFT AXIS DEVIATION

MINIMAL VOLTAGE CRITERIA FOR LVH, MAY BE NORMAL VARIANT

SEPTAL INFARCT (CITED ON OR BEFORE 24-APR-2019)

T WAVE ABNORMALITY, CONSIDER INFEROLATERAL ISCHEMIA

ABNORMAL ECG

WHEN COMPARED WITH ECG OF 24-APR-2019 05:53,

SERIAL CHANGES OF SEPTAL INFARCT PRESENT

Confirmed by ALEJA PRUITT MD ([id number]) on 4/25/2019 12:06:01 PM

 

Referred By:             Confirmed By:ALEJA PRUITT MD

## 2019-04-26 VITALS — HEART RATE: 90 BPM

## 2019-04-26 VITALS — TEMPERATURE: 97.8 F | SYSTOLIC BLOOD PRESSURE: 142 MMHG | DIASTOLIC BLOOD PRESSURE: 101 MMHG

## 2019-04-26 LAB
ALBUMIN SERPL-MCNC: 2.7 G/DL (ref 3.4–5)
ALP SERPL-CCNC: 49 U/L (ref 45–117)
ALT SERPL-CCNC: 21 U/L (ref 13–61)
ANION GAP SERPL CALC-SCNC: 8 MMOL/L (ref 8–16)
AST SERPL-CCNC: 26 U/L (ref 15–37)
BASOPHILS # BLD: 0.5 % (ref 0–2)
BILIRUB SERPL-MCNC: 0.3 MG/DL (ref 0.2–1)
BUN SERPL-MCNC: 39 MG/DL (ref 7–18)
CALCIUM SERPL-MCNC: 8 MG/DL (ref 8.5–10.1)
CHLORIDE SERPL-SCNC: 104 MMOL/L (ref 98–107)
CO2 SERPL-SCNC: 26 MMOL/L (ref 21–32)
CREAT SERPL-MCNC: 3 MG/DL (ref 0.55–1.3)
DEPRECATED RDW RBC AUTO: 15.4 % (ref 11.9–15.9)
EOSINOPHIL # BLD: 6.9 % (ref 0–4.5)
GLUCOSE SERPL-MCNC: 114 MG/DL (ref 74–106)
HCT VFR BLD CALC: 36.9 % (ref 35.4–49)
HGB BLD-MCNC: 12.2 GM/DL (ref 11.7–16.9)
LYMPHOCYTES # BLD: 18.4 % (ref 8–40)
MAGNESIUM SERPL-MCNC: 1.9 MG/DL (ref 1.8–2.4)
MCH RBC QN AUTO: 30.8 PG (ref 25.7–33.7)
MCHC RBC AUTO-ENTMCNC: 33.1 G/DL (ref 32–35.9)
MCV RBC: 93.1 FL (ref 80–96)
MONOCYTES # BLD AUTO: 5.6 % (ref 3.8–10.2)
NEUTROPHILS # BLD: 68.6 % (ref 42.8–82.8)
PHOSPHATE SERPL-MCNC: 3.7 MG/DL (ref 2.5–4.9)
PLATELET # BLD AUTO: 251 K/MM3 (ref 134–434)
PMV BLD: 8.4 FL (ref 7.5–11.1)
POTASSIUM SERPLBLD-SCNC: 4.3 MMOL/L (ref 3.5–5.1)
PROT SERPL-MCNC: 6 G/DL (ref 6.4–8.2)
RBC # BLD AUTO: 3.96 M/MM3 (ref 4–5.6)
SODIUM SERPL-SCNC: 137 MMOL/L (ref 136–145)
WBC # BLD AUTO: 5.9 K/MM3 (ref 4–10)

## 2019-04-26 RX ADMIN — HEPARIN SODIUM SCH UNIT: 5000 INJECTION, SOLUTION INTRAVENOUS; SUBCUTANEOUS at 05:40

## 2019-04-26 RX ADMIN — HEPARIN SODIUM SCH UNIT: 5000 INJECTION, SOLUTION INTRAVENOUS; SUBCUTANEOUS at 13:39

## 2019-04-26 RX ADMIN — POLYETHYLENE GLYCOL 3350 SCH GM: 17 POWDER, FOR SOLUTION ORAL at 09:17

## 2019-04-26 RX ADMIN — LEVOTHYROXINE SODIUM SCH MCG: 125 TABLET ORAL at 07:14

## 2019-04-26 NOTE — CON.CARD
Consult


Consult Specialty:: Cardiology


Referred by:: Dr. Quezada


Reason for Consultation:: hypertension and abnl ECG





- History of Present Illness


Chief Complaint: dizziness and HTN


History of Present Illness: 





63M h/o CAD, s/p reported MI, CABG 2 years ago was at rehab (heroin and cocaine

) and was sent to [facility] ER for elevated BP (200s systolic) and dizziness.


He was admitted for hypertensive urgency and started on PO meds with gradual 

reduction in BP.





He was also found to have chronic renal failure.





Denies CP, SOB, paps, edema, PND or orthopnea.





- History Source


History Provided By: Patient, Medical Record





- Past Medical History


Cardio/Vascular: Yes: CAD, HTN, Hyperlipdemia, MI


Pulmonary: No: Asthma, Bronchitis, Cancer, COPD, O2 Dependent, Pneumonia, 

Previously Intubated, Pulmonary Embolus, Pulmonary Fibrosis, Sleep Apnea, Other


Gastrointestinal: No: Ascites, Cancer, Constipation, Crohn's Disease, 

Diverticulitis, Diverticulosis, Esophageal Varices, Gastritis, GERD, GI Bleed, 

Hemorrhoids, Hiatal Hernia, Inflamatory Bowel Disease, Irritable Bowel Disease, 

Pancreatitis, Peptic Ulcer Disease, Ulcerative Colitis, Other


Renal/: Yes: Renal Inusuff


Heme/Onc: No: Anemia, B12 Deficiency, Bleeding Disorder, Cancer, Current 

Chemotherapy, Current Radiation Therapy, Hemochromatosis, Hypercoaguable State, 

Myeloproliferative Synd, Sickle Cell Disease, Sickle Cell Trait, 

Thrombocytopenia, Other


Infectious Disease: No: AIDS, C-Diff, Herpes Zoster, HIV, MRSA, STD's, 

Tuberculosis, VREF, Other


Psych: Yes: Addictions


Musculoskeletal: No: Bursitis, Chronic low back pain, Hemiparesis, Hemiplegia, 

Osteoarthritis, Paraplegia, Other


Rheumatology: No: Fibromyalgia, Gout, Lupus, Rheumatoid Arthritis, Sarcoidosis, 

Vasculitis, Other


ENT: No: Allergic Rhinitis, Sinusitis, Other


Endocrine: Yes: Hypothyroidism





- Past Surgical History


Past Surgical History: Yes: CABG





- Alcohol/Substance Use


Hx Alcohol Use: Yes


History of Substance Use: reports: Cocaine, Heroin





- Smoking History


Smoking history: Former smoker


Have you smoked in the past 12 months: Yes


Aproximately how many cigarettes per day: 10





- Social History


Usual Living Arrangement: Alone


History of Recent Travel: No





Home Medications





- Allergies


Allergies/Adverse Reactions: 


 Allergies











Allergy/AdvReac Type Severity Reaction Status Date / Time


 


No Known Allergies Allergy   Verified 04/23/19 22:59














- Home Medications


Home Medications: 


Ambulatory Orders





Levothyroxine [Synthroid -] 125 mcg PO DAILY 04/23/19 











Family Disease History





- Family Disease History


Family History: Unremarkable (not pertinent to this presentation)





Review of Systems


Findings/Remarks: 





see HPI





- Review of Systems


Constitutional: reports: No Symptoms


Neck: reports: No Symptoms


Cardiovascular: reports: No Symptoms


Respiratory: reports: No Symptoms


Gastrointestinal: reports: No Symptoms


Genitourinary: reports: No Symptoms


Breasts: reports: No Symptoms Reported


Musculoskeletal: reports: No Symptoms


Integumentary: reports: No Symptoms


Neurological: reports: Dizziness





- Risk Factors


Known Risk Factors: Yes: Hypertension, Prior MI /Emb Stroke


Vital Signs: 


 Vital Signs











Temperature  98.0 F   04/26/19 06:00


 


Pulse Rate  64   04/26/19 07:16


 


Respiratory Rate  18   04/26/19 07:16


 


Blood Pressure  156/100   04/26/19 07:16


 


O2 Sat by Pulse Oximetry (%)  98   04/25/19 21:00











Constitutional: Yes: No Distress


Eyes: Yes: Conjunctiva Clear, EOM Intact


HENT: Yes: Normocephalic


Neck: Yes: Trachea Midline


Respiratory: Yes: CTA Bilaterally


Gastrointestinal: Yes: Soft


Cardiovascular: Yes: Regular Rate and Rhythm


JVD: No


Carotid Bruit: No


PMI: Non-Displaced


Heart Sounds: Yes: S1, S2 (no murmurs)


Edema: No


Peripheral Pulses WNL: Yes


Neurological: Yes: Alert, Oriented





- Other Data


Labs, Other Data: 


 CBC, BMP





 04/24/19 05:40 





 04/24/19 05:40 





 INR, PTT











INR  0.91  (0.83-1.09)   04/24/19  05:40    








 Laboratory Tests











  04/24/19 04/24/19 04/24/19





  05:40 05:40 18:45


 


WBC  5.1  


 


Hgb  12.0  


 


Plt Count  266  


 


Sodium   141 


 


Potassium   4.4 


 


BUN   26 H 


 


Creatinine   2.8 H 


 


Creatine Kinase   697 H  656 H


 


Troponin I   0.04  0.03


 


B-Natriuretic Peptide   955.2 H 


 


TSH   79.70 H  52.60 H D


 


Cocaine Screen   


 


HIV Genotype   














  04/24/19 04/25/19





  18:45 08:00


 


WBC  


 


Hgb  


 


Plt Count  


 


Sodium  


 


Potassium  


 


BUN  


 


Creatinine  


 


Creatine Kinase  


 


Troponin I  


 


B-Natriuretic Peptide  


 


TSH  


 


Cocaine Screen   Positive A*


 


HIV Genotype  Non reactive 














NSR , LVH TWI V5/V6 and inferiorly


Echo: Report Reviewed


Prior Cardiac Procedures: CABG





Imaging





- Results


Chest X-ray: Image Reviewed


EKG: Image Reviewed





Problem List





- Problems


(1) Hypertensive urgency


Code(s): I16.0 - HYPERTENSIVE URGENCY   





(2) CAD (coronary artery disease)


Code(s): I25.10 - ATHSCL HEART DISEASE OF NATIVE CORONARY ARTERY W/O ANG PCTRS 

  


Qualifiers: 


   Coronary Disease-Associated Artery/Lesion type: bypass graft   Associated 

angina: without angina 





(3) CKD (chronic kidney disease)


Code(s): N18.9 - CHRONIC KIDNEY DISEASE, UNSPECIFIED   


Qualifiers: 


   Chronic kidney disease stage: stage 3 (moderate)   Qualified Code(s): N18.3 

- Chronic kidney disease, stage 3 (moderate)   





(4) Cocaine abuse


Code(s): F14.10 - COCAINE ABUSE, UNCOMPLICATED   





(5) HTN (hypertension)


Code(s): I10 - ESSENTIAL (PRIMARY) HYPERTENSION   


Qualifiers: 


   Hypertension type: unspecified   Qualified Code(s): I10 - Essential (primary

) hypertension   





(6) Heroin abuse


Code(s): F11.10 - OPIOID ABUSE, UNCOMPLICATED   





Assessment/Plan





IMP:


1. Cardiomyopathy: likely multifactorial (ischemic and drug related)


2. CAD s/p CABG


3. Hypertensive urgency, resolved


4. Hypertensive heart disease


5. Abnl ECG


6. CKD





REC:


1. Agree with combo therapy with Ca2+ blocker and Hydralazine.


ACE/ARB contraindicated with this degree of CKD.


Would be cautious with diuretics as well.


Avoid beta blockers with h/o cocaine use.


Titrate Procardia XL to target -150/90s.





2. LV dysfx : Moderate and unclear if chronic at this time. Appears euvolemic


-As above, we are limited in ability to use ACE/ARB and beta blockers.


-Would check pharm MIBI as outpatient or prior to discharge once BP stable to r/

o ischemia.





3. Abnl ECG is likely secondary to hypertensive heart disease, but prudent to 

obtain ischemic eval either soon as outpatient or prior to d/c given 

concomitant LV dysfx.





4. Renal f/u





Thank you.





Will follow.

## 2019-04-26 NOTE — PN
Teaching Attending Note


Name of Resident: Sahra Melendrez





ATTENDING PHYSICIAN STATEMENT





I saw and evaluated the patient.


I reviewed the resident's note and discussed the case with the resident.


I agree with the resident's findings and plan as documented.








SUBJECTIVE:








OBJECTIVE:


 Vital Signs











Temperature  98.0 F   04/26/19 06:00


 


Pulse Rate  64   04/26/19 07:16


 


Respiratory Rate  18   04/26/19 07:16


 


Blood Pressure  156/100   04/26/19 07:16


 


O2 Sat by Pulse Oximetry (%)  98   04/25/19 21:00








 








Elderly man comfortable not in distress





HEENT: Mm moist, no anemia





NECK: No JVd No Bruit





CHEST: CTA B/L





CVS: S1S2 R





ABD: no distention, non tender Bs +





EXT: Trace tara afeet, no calf tenderness





CNS: AOX3 non focal





 CBC, BMP





 04/26/19 10:20 





 04/26/19 10:20 





























 Active Medications





Active Medications





Clonidine (Catapres -)  0.1 mg PO Q6H PRN


   PRN Reason: Withdrawal Symptoms


   Stop: 04/26/19 23:59


   Last Admin: 04/26/19 05:40 Dose:  0.1 mg


Heparin Sodium (Porcine) (Heparin -)  5,000 unit SQ TID [facility]


   Last Admin: 04/26/19 05:40 Dose:  5,000 unit


Hydralazine HCl (Apresoline -)  25 mg PO Q6HPO [facility]


Levothyroxine Sodium (Synthroid -)  125 mcg PO DAILY@0700 [facility]


   Last Admin: 04/26/19 07:14 Dose:  125 mcg


Methadone HCl (Dolophine -)  10 mg PO ONCE ONE


   Stop: 04/26/19 10:01


Methadone HCl (Dolophine -)  5 mg PO ONCE ONE


   Stop: 04/27/19 06:01


Nifedipine (Procardia Xl -)  60 mg PO DAILY [facility]


Polyethylene Glycol (Miralax (For Daily Use) -)  17 gm PO DAILY [facility]


   Last Admin: 04/25/19 09:56 Dose:  Not Given














ASSESSMENT AND PLAN: 63 year old man with a history of HTN, CAD, MI, CABG, 

hypothyroidism, thyroidectomy, CKD, depression, anxiety who presented to [facility] last night requesting detox from heroin. He was not withdrawing at the 

time. He did not want to be detoxed with methadone. BP was 217/121 transferred 

to [facility] for further management.








Problem List





- Problems


(1) Hypertensive urgency


Assessment/Plan: 


Due to non compliance and  Opiate withdrawal  now BP is better controlled will 

optimize Bp control increase CCB as recommended by Cardiology , will cont all  

current meds.


Code(s): I16.0 - HYPERTENSIVE URGENCY   





(2) Heroin withdrawal


Assessment/Plan: 


Cont Methadone  case discussed with Eusebia montero Dr team recommended that patient 

has completed Detox , needs to F/U for rehabilitation as out patient, also 

discuss with the discharge planner and  they will provide him list of various 

program in the are, patient is homeless 


Code(s): F11.23 - OPIOID DEPENDENCE WITH WITHDRAWAL   





(3) Cocaine abuse


Assessment/Plan: 


counselled for cocaine cesation therapy


Code(s): F14.10 - COCAINE ABUSE, UNCOMPLICATED   





(4) CAD (coronary artery disease)


Assessment/Plan: 


S/P CABG non complince F/U LIpid panel B Blockers are contraindicated  cont 

Hydralazine and CC


Code(s): I25.10 - ATHSCL HEART DISEASE OF NATIVE CORONARY ARTERY W/O ANG PCTRS 

  


Qualifiers: 


   Coronary Disease-Associated Artery/Lesion type: bypass graft   Associated 

angina: without angina 





(5) CKD (chronic kidney disease)


Assessment/Plan: 


CKd stage 3 , plaese get old record from PMD office


Code(s): N18.9 - CHRONIC KIDNEY DISEASE, UNSPECIFIED   


Qualifiers: 


   Chronic kidney disease stage: stage 3 (moderate)   Qualified Code(s): N18.3 

- Chronic kidney disease, stage 3 (moderate)   





(6) Hypothyroid


Assessment/Plan: 


Non compliant cont Levothyroxine at current dose F/U TSh as out patient.


Code(s): E03.9 - HYPOTHYROIDISM, UNSPECIFIED

## 2019-04-26 NOTE — PN
Progress Note, Physician


History of Present Illness: 





Pt seen and examined at bedside. He is awake and alert. He denies chest pain. 





- Current Medication List


Current Medications: 


Active Medications





Aspirin (Asa -)  81 mg PO DAILY [facility]


   Last Admin: 04/26/19 13:39 Dose:  81 mg


Clonidine (Catapres -)  0.1 mg PO Q6H PRN


   PRN Reason: Withdrawal Symptoms


   Stop: 04/26/19 23:59


   Last Admin: 04/26/19 05:40 Dose:  0.1 mg


Docusate Sodium (Colace -)  100 mg PO DAILY [facility]


   Last Admin: 04/26/19 09:17 Dose:  100 mg


Heparin Sodium (Porcine) (Heparin -)  5,000 unit SQ TID [facility]


   Last Admin: 04/26/19 13:39 Dose:  5,000 unit


Hydralazine HCl (Apresoline -)  25 mg PO Q6HPO [facility]


   Last Admin: 04/26/19 13:39 Dose:  25 mg


Levothyroxine Sodium (Synthroid -)  125 mcg PO DAILY@0700 [facility]


   Last Admin: 04/26/19 07:14 Dose:  125 mcg


Methadone HCl (Dolophine -)  5 mg PO ONCE ONE


   Stop: 04/27/19 06:01


Nifedipine (Procardia Xl -)  60 mg PO DAILY [facility]


   Last Admin: 04/26/19 09:17 Dose:  60 mg


Polyethylene Glycol (Miralax (For Daily Use) -)  17 gm PO DAILY [facility]


   Last Admin: 04/26/19 09:17 Dose:  17 gm


Senna (Senna -)  1 tab PO BID [facility]


   Last Admin: 04/26/19 09:17 Dose:  1 tab











- Objective


Vital Signs: 


 Selected Entries











  04/26/19 04/26/19 04/26/19





  02:00 03:00 06:00


 


Blood Pressure 150/110 H 142/86 157/103 H














  04/26/19





  07:16


 


Blood Pressure 156/100











Constitutional: Yes: Calm


Eyes: Yes: Conjunctiva Clear


HENT: Yes: Atraumatic


Neck: Yes: Supple


Cardiovascular: Yes: S1, S2


Respiratory: Yes: CTA Bilaterally


Gastrointestinal: Yes: Soft


Genitourinary: Yes: WNL


Musculoskeletal: Yes: WNL


Edema: No


Integumentary: Yes: Tattoos


Neurological: Yes: Oriented


Psychiatric: Yes: Oriented


Labs: 


 CBC, BMP





 04/26/19 10:20 





 04/26/19 10:20 





 INR, PTT











INR  0.91  (0.83-1.09)   04/24/19  05:40    














Problem List





- Problems


(1) CKD (chronic kidney disease)


Code(s): N18.9 - CHRONIC KIDNEY DISEASE, UNSPECIFIED   


Qualifiers: 


   Chronic kidney disease stage: stage 3 (moderate)   Qualified Code(s): N18.3 

- Chronic kidney disease, stage 3 (moderate)   





(2) Cocaine abuse


Code(s): F14.10 - COCAINE ABUSE, UNCOMPLICATED   





(3) Fentanyl use disorder, mild, abuse


Code(s): F11.10 - OPIOID ABUSE, UNCOMPLICATED   





(4) HTN (hypertension)


Code(s): I10 - ESSENTIAL (PRIMARY) HYPERTENSION   


Qualifiers: 


   Hypertension type: unspecified   Qualified Code(s): I10 - Essential (primary

) hypertension   





Assessment/Plan


 Current Medications











Generic Name Dose Route Start Last Admin





  Trade Name Freq  PRN Reason Stop Dose Admin


 


Aspirin  81 mg  04/26/19 13:00  04/26/19 13:39





  Asa -  PO   81 mg





  DAILY NIGEL   Administration





     





     





     





     


 


Clonidine  0.1 mg  04/24/19 08:34  04/26/19 05:40





  Catapres -  PO  04/26/19 23:59  0.1 mg





  Q6H PRN   Administration





  Withdrawal Symptoms   





     





     





     


 


Docusate Sodium  100 mg  04/26/19 10:00  04/26/19 09:17





  Colace -  PO   100 mg





  DAILY NIGEL   Administration





     





     





     





     


 


Heparin Sodium (Porcine)  5,000 unit  04/24/19 14:00  04/26/19 13:39





  Heparin -  SQ   5,000 unit





  TID NIGEL   Administration





     





     





     





     


 


Hydralazine HCl  25 mg  04/26/19 07:04  04/26/19 13:39





  Apresoline -  PO   25 mg





  Q6HPO NIGEL   Administration





     





     





     





     


 


Levothyroxine Sodium  125 mcg  04/25/19 07:00  04/26/19 07:14





  Synthroid -  PO   125 mcg





  DAILY@0700 NIGEL   Administration





     





     





     





     


 


Methadone HCl  5 mg  04/27/19 06:00  





  Dolophine -  PO  04/27/19 06:01  





  ONCE ONE   





     





     





     





     


 


Nifedipine  60 mg  04/26/19 06:49  04/26/19 09:17





  Procardia Xl -  PO   60 mg





  DAILY NIGEL   Administration





     





     





     





     


 


Polyethylene Glycol  17 gm  04/24/19 10:00  04/26/19 09:17





  Miralax (For Daily Use) -  PO   17 gm





  DAILY NIGEL   Administration





     





     





     





     


 


Senna  1 tab  04/26/19 10:00  04/26/19 09:17





  Senna -  PO   1 tab





  BID NIGEL   Administration





     





     





     





     











Impression


1. CKD


2. atrophic right kidney


3. HTN


4. cad


5. multi drug abuse - cocaine, heroin and fentanyl


6. hypothyroidism


7. proteinuria





Plan


- bp is stabilizing


- cont po meds


- monitor renal function


- will need outpt follow up after discharge


- cardio input appreciated

## 2019-04-26 NOTE — DS
Physical Exam: 


SUBJECTIVE: Patient seen and examined this AM. BP better controlled this AM. No 

new complaints. No acute overnight events as per nursing staff.





OBJECTIVE:





 Vital Signs











 Period  Temp  Pulse  Resp  BP Sys/Jimenez  Pulse Ox


 


 Last 24 Hr  97.7 F-98.1 F  64-90  16-20  123-158/  96-98








PHYSICAL EXAM





GENERAL: A&Ox3, NAD


HEAD: NCAT


EYES: PERRL, EOMI


ENT: moist mucous membranes


NECK: Supple


LUNGS: Diminished breath sounds at the bases, no wheezes, no crackles


HEART: Regular rate and rhythm, S1, S2 without murmur


ABDOMEN: Soft, nontender, nondistended, + bowel sounds, no guarding


EXTREMITIES: no edema. 


NEUROLOGICAL: Cranial nerves II through XII grossly intact. 


SKIN: Warm, dry





LABS


 Laboratory Last Values











WBC  5.9 K/mm3 (4.0-10.0)   04/26/19  10:20    


 


RBC  3.96 M/mm3 (4.00-5.60)  L  04/26/19  10:20    


 


Hgb  12.2 GM/dL (11.7-16.9)   04/26/19  10:20    


 


Hct  36.9 % (35.4-49)   04/26/19  10:20    


 


MCV  93.1 fl (80-96)   04/26/19  10:20    


 


MCH  30.8 pg (25.7-33.7)   04/26/19  10:20    


 


MCHC  33.1 g/dl (32.0-35.9)   04/26/19  10:20    


 


RDW  15.4 % (11.9-15.9)   04/26/19  10:20    


 


Plt Count  251 K/MM3 (134-434)   04/26/19  10:20    


 


MPV  8.4 fl (7.5-11.1)   04/26/19  10:20    


 


Absolute Neuts (auto)  4.1 K/mm3 (1.5-8.0)   04/26/19  10:20    


 


Neutrophils %  68.6 % (42.8-82.8)  D 04/26/19  10:20    


 


Lymphocytes %  18.4 % (8-40)  D 04/26/19  10:20    


 


Monocytes %  5.6 % (3.8-10.2)   04/26/19  10:20    


 


Eosinophils %  6.9 % (0-4.5)  H  04/26/19  10:20    


 


Basophils %  0.5 % (0-2.0)   04/26/19  10:20    


 


Nucleated RBC %  0 % (0-0)   04/26/19  10:20    


 


PT with INR  10.70 SEC (9.7-13.0)   04/24/19  05:40    


 


INR  0.91  (0.83-1.09)   04/24/19  05:40    


 


Sodium  137 mmol/L (136-145)   04/26/19  10:20    


 


Potassium  4.3 mmol/L (3.5-5.1)   04/26/19  10:20    


 


Chloride  104 mmol/L ()   04/26/19  10:20    


 


Carbon Dioxide  26 mmol/L (21-32)   04/26/19  10:20    


 


Anion Gap  8 MMOL/L (8-16)   04/26/19  10:20    


 


BUN  39 mg/dL (7-18)  H  04/26/19  10:20    


 


Creatinine  3.0 mg/dL (0.55-1.3)  H  04/26/19  10:20    


 


Creat Clearance w eGFR  21.31  (>60)   04/26/19  10:20    


 


Random Glucose  114 mg/dL ()  H  04/26/19  10:20    


 


Hemoglobin A1c %  5.5 % (4.2-6.3)   04/24/19  18:45    


 


Calcium  8.0 mg/dL (8.5-10.1)  L  04/26/19  10:20    


 


Phosphorus  3.7 mg/dL (2.5-4.9)   04/26/19  10:20    


 


Magnesium  1.9 mg/dL (1.8-2.4)   04/26/19  10:20    


 


Total Bilirubin  0.3 mg/dL (0.2-1)   04/26/19  10:20    


 


AST  26 U/L (15-37)   04/26/19  10:20    


 


ALT  21 U/L (13-61)   04/26/19  10:20    


 


Alkaline Phosphatase  49 U/L ()   04/26/19  10:20    


 


Creatine Kinase  656 U/L ()  H  04/24/19  18:45    


 


Creatine Kinase Index  1.4 % (0.0-5.0)   04/24/19  18:45    


 


CK-MB (CK-2)  9.6 ng/mL (0.5-3.6)  H  04/24/19  18:45    


 


Troponin I  0.03 ng/ml (0.00-0.05)   04/24/19  18:45    


 


B-Natriuretic Peptide  955.2 pg/ml (5-125)  H  04/24/19  05:40    


 


Total Protein  6.0 g/dl (6.4-8.2)  L  04/26/19  10:20    


 


Albumin  2.7 g/dl (3.4-5.0)  L  04/26/19  10:20    


 


Prealbumin  27.8 mg/dl (20-40)   04/24/19  05:40    


 


TSH  52.60 uIU/ml (0.358-3.74)  H D 04/24/19  18:45    


 


Urine Color  Yellow   04/25/19  08:00    


 


Urine Appearance  Clear   04/25/19  08:00    


 


Urine pH  8.0  (5.0-8.0)   04/25/19  08:00    


 


Ur Specific Gravity  1.014  (1.010-1.035)   04/25/19  08:00    


 


Urine Protein  2+  (NEGATIVE)  H  04/25/19  08:00    


 


Urine Glucose (UA)  Negative  (NEGATIVE)   04/25/19  08:00    


 


Urine Ketones  Negative  (NEGATIVE)   04/25/19  08:00    


 


Urine Blood  Negative  (NEGATIVE)   04/25/19  08:00    


 


Urine Nitrite  Negative  (NEGATIVE)   04/25/19  08:00    


 


Urine Bilirubin  Negative  (NEGATIVE)   04/25/19  08:00    


 


Urine Urobilinogen  0.2 mg/dL (0.2-1.0)   04/25/19  08:00    


 


Ur Leukocyte Esterase  Negative  (NEGATIVE)   04/25/19  08:00    


 


Urine WBC (Auto)  1 /hpf (0-5)   04/25/19  08:00    


 


Urine RBC (Auto)  1 /hpf (0-4)   04/25/19  08:00    


 


Urine Casts (Auto)  0 /lpf (0-8)   04/25/19  08:00    


 


U Epithel Cells (Auto)  0.3 /HPF (0-5/HPF)   04/25/19  08:00    


 


Urine Bacteria (Auto)  5.9 /hpf (NEGATIVE)   04/25/19  08:00    


 


U Random Total Protein  103.8 mg/dl (0-11.9)  H  04/25/19  08:00    


 


Ur Random Sodium  154 MMOL/L ()   04/25/19  08:00    


 


Ur Random Potassium  32.9 MMOL/L ()   04/25/19  08:00    


 


Ur Random Chloride  161 MMOL/L (110-250)   04/25/19  08:00    


 


Urine Creatinine  70.0 mg/dL ()   04/25/19  08:00    


 


Protein/Creatinin Ratio  1.480 MG/DL  04/25/19  08:00    


 


Opiates Screen  Negative ng/ml (KKIWUV=628)   04/25/19  08:00    


 


Methadone Screen  Negative ng/ml (JQQSMV=817)   04/25/19  08:00    


 


Barbiturate Screen  Negative ng/ml (VYTHEO=561)   04/25/19  08:00    


 


Phencyclidine Screen  Negative ng/ml (CUTOFF=25)   04/25/19  08:00    


 


Ur Amphetamines Screen  Negative ng/ml (HJMCJA=242)   04/25/19  08:00    


 


MDMA (Ecstasy) Screen  Negative ng/ml (ZNDETG=015)   04/25/19  08:00    


 


Benzodiazepines Screen  Negative ng/ml (CPRAGY=474)   04/25/19  08:00    


 


Cocaine Screen  Positive ng/ml (ZNYBQI=918)  A*  04/25/19  08:00    


 


U Marijuana (THC) Screen  Negative ng/ml (CUTOFF=50)   04/25/19  08:00    


 


HIV Genotype  Non reactive  (Non Reactive)   04/24/19  18:45    











 Microbiology





04/24/19 05:40   Urine - Urine Clean Catch   Urine Culture - Final


                            NO GROWTH OBTAINED








IMAGING:


-CXR: Cardiomegaly. There is no evidence of pneumonia, atelectasis, CHF. 


-Kidney/Renal US: Atrophic right kidney. Echogenic left kidney that may 

represent medical renal disease. Correlate clinically. There is no gross 

evidence of hydronephrosis or renal stones, bilaterally. There is suggestion of 

incidental gallstone for which correlation with a dedicated gallbladder 

ultrasound.


-EKG: NSR, LAD, VR 69, QTc 462


-ECHO: LV is grossly normal in size. LV systolic function is mild to moderately 

reduced. Regional QMA cannot be excluded. Mild to moderate TR. Mild MR. 








HOSPITAL COURSE:





Date of Admission:04/24/19





Date of Discharge: 04/26/19





64 y/o M with PMHx polysubstance abuse (fentanyl, heroin, cocaine), 

Hypothyroidism (s/p thyroidectomy), HTN, MI, CAD (s/p CABG in 2016), CKD, 

depression, anxiety who presented to [facility] for detox, found to have 

elevated BP and was admitted to [facility] for Hypertensive Emergency. Imaging, micro

, and recent lab work noted above. Cardiology and Nephrology were consulted. 

Patient completed his methadone detox during his hospital stay. Patient was 

started on PO Hydralazine and Nifedipine with which his BP reached goal. As 

apart of the methadone protocol, patient was also placed on Clonidine. ACEi/ARB 

were held in light of his CKD; beta blockers held in the setting of Cocaine 

dependence. Patient was discharged with strict instructions for medication 

compliance, physician follow up and drugs/narcotic cessation. Patient was given 

additional resources including instruction to follow up with [facility] 

Outpatient clinic.











Minutes to complete discharge: 36





Discharge Summary


Reason For Visit: COCAINE ABUSE,HEROINE ABUSE,MILD FENTANYL ABUSE,


Current Active Problems





CAD (coronary artery disease) (Acute)


CKD (chronic kidney disease) (Acute)


Cocaine abuse (Acute)


Fentanyl use disorder, mild, abuse (Acute)


HTN (hypertension) (Acute)


Heroin abuse (Acute)


Heroin withdrawal (Acute)


Hypertensive urgency (Acute)


Hypothyroid (Acute)








Condition: Improved





- Instructions


Diet, Activity, Other Instructions: 


You were admitted because of dangerously elevated blood pressures.


We have made some changes to your blood pressure medications.


Take as directed:





Hydralazine (Apresoline) 25mg 3 times a day.


Nifedepine (Procardia XL) 60mg once a day








It is VERY important to follow up 1 week after discharge with your Primary care 

physician.


You also need to follow up with your Cardiologist and Kidney doctor for further 

testing, including a Stress test of the heart. 





You will need need to make an appointment with outpatient rehab for tomorrow (4/ 27). Please call them as soon as you can. 














If you experience chest pain, shortness of breath, dizziness, palpitations, 

please call your doctor or go to the nearest emergency room.





Referrals: 


Ron Guerrier MD [Staff Physician] - 1 Week


Luma Santamaria MD [Staff Physician] - 1 Week


Delmy Villafana DO [Staff Physician] - 04/27/19


Disposition: HOME





- Home Medications


Comprehensive Discharge Medication List: 


Ambulatory Orders





Levothyroxine [Synthroid -] 125 mcg PO DAILY 04/23/19 


Aspirin [ASA -] 81 mg PO DAILY #30 tab.chew 04/26/19 


Docusate Sodium [Colace -] 100 mg PO DAILY  capsule 04/26/19 


Hydralazine HCl 25 mg PO TID #90 tablet 04/26/19 


Nifedipine ER [Procardia XL -] 60 mg PO DAILY #30 tab.er.24 04/26/19 


Polyethylene Glycol 3350 [Miralax 119 gm Btl -] 17 gm PO DAILY  bottle 04/26/19 


Sennosides [Senna -] 1 tab PO BID  tablet 04/26/19 








This patient is new to me today: No


Emergency Visit: Yes


ED Registration Date: 04/24/19


Care time: The patient presented to the Emergency Department on the above date 

and was hospitalized for further evaluation of their emergent condition.


Critical Care patient: No





- Discharge Referral


Referred to [facility] No

## 2025-05-15 ENCOUNTER — HOSPITAL ENCOUNTER (INPATIENT)
Dept: HOSPITAL 74 - YASAS | Age: 70
LOS: 10 days | Discharge: ANOTHER HOSPITAL | DRG: 895 | End: 2025-05-25
Attending: PSYCHIATRY & NEUROLOGY | Admitting: PSYCHIATRY & NEUROLOGY
Payer: COMMERCIAL

## 2025-05-15 VITALS — BODY MASS INDEX: 24.3 KG/M2

## 2025-05-15 DIAGNOSIS — F14.20: ICD-10-CM

## 2025-05-15 DIAGNOSIS — I25.10: ICD-10-CM

## 2025-05-15 DIAGNOSIS — I13.0: ICD-10-CM

## 2025-05-15 DIAGNOSIS — I50.9: ICD-10-CM

## 2025-05-15 DIAGNOSIS — I83.93: ICD-10-CM

## 2025-05-15 DIAGNOSIS — F19.24: ICD-10-CM

## 2025-05-15 DIAGNOSIS — N18.30: ICD-10-CM

## 2025-05-15 DIAGNOSIS — F17.210: ICD-10-CM

## 2025-05-15 DIAGNOSIS — R60.0: ICD-10-CM

## 2025-05-15 DIAGNOSIS — Z99.3: ICD-10-CM

## 2025-05-15 DIAGNOSIS — Z95.1: ICD-10-CM

## 2025-05-15 DIAGNOSIS — F11.20: Primary | ICD-10-CM

## 2025-05-15 DIAGNOSIS — E03.9: ICD-10-CM

## 2025-05-15 PROCEDURE — HZ42ZZZ GROUP COUNSELING FOR SUBSTANCE ABUSE TREATMENT, COGNITIVE-BEHAVIORAL: ICD-10-PCS | Performed by: PSYCHIATRY & NEUROLOGY

## 2025-05-15 RX ADMIN — Medication SCH MG: at 21:25

## 2025-05-15 RX ADMIN — NICOTINE SCH MG: 14 PATCH, EXTENDED RELEASE TRANSDERMAL at 12:14

## 2025-05-15 RX ADMIN — Medication SCH TAB: at 12:14

## 2025-05-15 RX ADMIN — SULFAMETHOXAZOLE AND TRIMETHOPRIM SCH EACH: 800; 160 TABLET ORAL at 13:50

## 2025-05-15 RX ADMIN — HYDRALAZINE HYDROCHLORIDE SCH MG: 25 TABLET, FILM COATED ORAL at 13:50

## 2025-05-15 RX ADMIN — TUBERCULIN PURIFIED PROTEIN DERIVATIVE ONE ML: 5 INJECTION, SOLUTION INTRADERMAL at 17:51

## 2025-05-16 LAB
ALBUMIN SERPL-MCNC: 2.6 G/DL (ref 3.4–5)
APPEARANCE UR: CLEAR
BACTERIA # UR AUTO: 0 /UL (ref 0–1359)
BILIRUB SERPL-MCNC: 0.2 MG/DL (ref 0.2–1)
BILIRUB UR STRIP.AUTO-MCNC: NEGATIVE MG/DL
BUN SERPL-MCNC: 32.1 MG/DL (ref 7–18)
CASTS URNS QL MICRO: 0 /UL (ref 0–3.1)
COLOR UR: YELLOW
CREAT SERPL-MCNC: 3.3 MG/DL (ref 0.55–1.3)
EPITH CASTS URNS QL MICRO: 1 /UL (ref 0–25.1)
ERYTHROCYTE [DISTWIDTH] IN BLOOD: 18.6 % (ref 12.2–16.4)
HCT VFR BLD CALC: 29.6 % (ref 40.1–51)
HGB BLD-MCNC: 8.8 G/DL (ref 13.7–17.5)
KETONES UR QL STRIP: NEGATIVE
LEUKOCYTE ESTERASE UR QL STRIP.AUTO: NEGATIVE
MCHC RBC-ENTMCNC: 29.7 G/DL (ref 32.3–36.5)
MCV RBC: 93.1 FL (ref 79–92.2)
NITRITE UR QL STRIP: NEGATIVE
PLATELET # BLD AUTO: 403 X10^3/UL (ref 163–337)
PMV BLD: 10.3 FL (ref 9.4–12.4)
PROT SERPL-MCNC: 7.4 G/DL (ref 6.4–8.2)
PROT UR QL STRIP: (no result)
PROT UR QL STRIP: NEGATIVE
RBC # BLD AUTO: 2 /UL (ref 0–23.9)
SP GR UR: 1.01 (ref 1.01–1.03)
TREPONEMA PALLIDUM AB [UNITS/VOLUME] IN SERUM OR PLASMA BY IMMUNOASSAY: REACTIVE
UROBILINOGEN UR STRIP-MCNC: 0.2 MG/DL (ref 0.2–1)
WBC # UR AUTO: 1 /UL (ref 0–25.8)

## 2025-05-16 RX ADMIN — DOCUSATE SODIUM SCH MG: 100 CAPSULE, LIQUID FILLED ORAL at 09:12

## 2025-05-16 RX ADMIN — LEVOTHYROXINE SODIUM SCH MCG: 100 TABLET ORAL at 06:31

## 2025-05-16 RX ADMIN — ASPIRIN 81 MG SCH MG: 81 TABLET ORAL at 09:12

## 2025-05-16 RX ADMIN — BACLOFEN SCH MG: 10 TABLET ORAL at 21:21

## 2025-05-16 RX ADMIN — ACETAMINOPHEN PRN MG: 325 TABLET ORAL at 09:13

## 2025-05-16 RX ADMIN — NIFEDIPINE SCH MG: 60 TABLET, EXTENDED RELEASE ORAL at 09:12

## 2025-05-17 RX ADMIN — BISACODYL PRN MG: 10 SUPPOSITORY RECTAL at 15:34

## 2025-05-17 RX ADMIN — POLYETHYLENE GLYCOL 3350 PRN GM: 17 POWDER, FOR SOLUTION ORAL at 15:41

## 2025-05-17 RX ADMIN — HYDRALAZINE HYDROCHLORIDE SCH MG: 50 TABLET, FILM COATED ORAL at 21:30

## 2025-05-19 RX ADMIN — HYDRALAZINE HYDROCHLORIDE SCH MG: 50 TABLET, FILM COATED ORAL at 13:07

## 2025-05-19 RX ADMIN — SIMETHICONE CHEW TAB 80 MG PRN MG: 80 TABLET ORAL at 15:55

## 2025-05-20 RX ADMIN — CARVEDILOL SCH MG: 25 TABLET, FILM COATED ORAL at 21:29

## 2025-05-20 RX ADMIN — ATORVASTATIN CALCIUM SCH MG: 80 TABLET, FILM COATED ORAL at 21:30

## 2025-05-20 RX ADMIN — ISOSORBIDE MONONITRATE SCH MG: 30 TABLET, EXTENDED RELEASE ORAL at 10:54

## 2025-05-20 RX ADMIN — CARVEDILOL ONE MG: 25 TABLET, FILM COATED ORAL at 08:05

## 2025-05-20 RX ADMIN — FOLIC ACID SCH MG: 1 TABLET ORAL at 10:13

## 2025-05-20 RX ADMIN — TAMSULOSIN HYDROCHLORIDE SCH MG: 0.4 CAPSULE ORAL at 21:30

## 2025-05-20 RX ADMIN — AMLODIPINE BESYLATE SCH MG: 10 TABLET ORAL at 10:16

## 2025-05-20 RX ADMIN — FERROUS SULFATE TAB EC 324 MG (65 MG FE EQUIVALENT) SCH MG: 324 (65 FE) TABLET DELAYED RESPONSE at 10:14

## 2025-05-20 RX ADMIN — HYDROXYZINE PAMOATE PRN MG: 25 CAPSULE ORAL at 07:41

## 2025-05-21 RX ADMIN — PETROLATUM SCH APPLIC: 42 OINTMENT TOPICAL at 11:00

## 2025-05-21 RX ADMIN — BENZONATATE PRN MG: 200 CAPSULE ORAL at 16:03

## 2025-05-22 RX ADMIN — ISOSORBIDE MONONITRATE SCH MG: 60 TABLET, EXTENDED RELEASE ORAL at 10:43

## 2025-05-22 RX ADMIN — AMLODIPINE BESYLATE SCH MG: 10 TABLET ORAL at 06:27

## 2025-05-25 VITALS — RESPIRATION RATE: 16 BRPM | DIASTOLIC BLOOD PRESSURE: 62 MMHG | TEMPERATURE: 97.1 F | SYSTOLIC BLOOD PRESSURE: 137 MMHG

## 2025-05-25 VITALS — HEART RATE: 71 BPM
